# Patient Record
Sex: MALE | URBAN - METROPOLITAN AREA
[De-identification: names, ages, dates, MRNs, and addresses within clinical notes are randomized per-mention and may not be internally consistent; named-entity substitution may affect disease eponyms.]

---

## 2022-11-17 ENCOUNTER — TRANSCRIBE ORDER (OUTPATIENT)
Dept: SCHEDULING | Age: 57
End: 2022-11-17

## 2022-11-17 DIAGNOSIS — M19.071 ARTHRITIS OF RIGHT ANKLE: Primary | ICD-10-CM

## 2022-12-05 ENCOUNTER — HOSPITAL ENCOUNTER (OUTPATIENT)
Dept: LAB | Age: 57
Discharge: HOME | End: 2022-12-05

## 2022-12-05 ENCOUNTER — HOSPITAL ENCOUNTER (OUTPATIENT)
Dept: MRI IMAGING | Age: 57
Discharge: HOME | End: 2022-12-05
Attending: PODIATRIST
Payer: COMMERCIAL

## 2022-12-05 DIAGNOSIS — M19.071 ARTHRITIS OF RIGHT ANKLE: ICD-10-CM

## 2022-12-05 LAB
SENTARA SPECIMEN COL,SENBCF: NORMAL
SENTARA SPECIMEN COL,SENBCF: NORMAL

## 2022-12-05 PROCEDURE — 99001 SPECIMEN HANDLING PT-LAB: CPT

## 2022-12-05 PROCEDURE — 73721 MRI JNT OF LWR EXTRE W/O DYE: CPT

## 2023-02-08 ENCOUNTER — HOSPITAL ENCOUNTER (OUTPATIENT)
Dept: PREADMISSION TESTING | Age: 58
Discharge: HOME | End: 2023-02-08
Payer: MEDICARE

## 2023-02-08 PROCEDURE — 93005 ELECTROCARDIOGRAM TRACING: CPT

## 2023-02-15 ENCOUNTER — ANESTHESIA EVENT (OUTPATIENT)
Facility: HOSPITAL | Age: 58
End: 2023-02-15
Payer: MEDICARE

## 2023-02-16 NOTE — PERIOP NOTE
Per Leah Barajas at Dr. Debby Vila office. He is aware of elevated potassium and will have patient redraw on dos.  They are aware that patient can be canceled if it remains abnormal.

## 2023-02-17 ENCOUNTER — APPOINTMENT (OUTPATIENT)
Facility: HOSPITAL | Age: 58
End: 2023-02-17
Attending: PODIATRIST
Payer: MEDICARE

## 2023-02-17 ENCOUNTER — ANESTHESIA (OUTPATIENT)
Facility: HOSPITAL | Age: 58
End: 2023-02-17
Payer: MEDICARE

## 2023-02-17 ENCOUNTER — HOSPITAL ENCOUNTER (OUTPATIENT)
Facility: HOSPITAL | Age: 58
Setting detail: OUTPATIENT SURGERY
Discharge: HOME | End: 2023-02-17
Attending: PODIATRIST | Admitting: PODIATRIST
Payer: MEDICARE

## 2023-02-17 VITALS
OXYGEN SATURATION: 95 % | WEIGHT: 237.2 LBS | HEART RATE: 63 BPM | TEMPERATURE: 97.4 F | RESPIRATION RATE: 16 BRPM | HEIGHT: 74 IN | SYSTOLIC BLOOD PRESSURE: 139 MMHG | DIASTOLIC BLOOD PRESSURE: 73 MMHG | BODY MASS INDEX: 30.44 KG/M2

## 2023-02-17 DIAGNOSIS — G89.18 POST-OP PAIN: Primary | ICD-10-CM

## 2023-02-17 LAB
GLUCOSE BLD STRIP.AUTO-MCNC: 118 MG/DL (ref 70–110)
GLUCOSE BLD STRIP.AUTO-MCNC: 134 MG/DL (ref 70–110)
POTASSIUM SERPL-SCNC: 4.1 MMOL/L (ref 3.5–5.5)

## 2023-02-17 PROCEDURE — 6360000002 HC RX W HCPCS: Performed by: PODIATRIST

## 2023-02-17 PROCEDURE — C1713 ANCHOR/SCREW BN/BN,TIS/BN: HCPCS | Performed by: PODIATRIST

## 2023-02-17 PROCEDURE — 36415 COLL VENOUS BLD VENIPUNCTURE: CPT

## 2023-02-17 PROCEDURE — 3700000000 HC ANESTHESIA ATTENDED CARE: Performed by: PODIATRIST

## 2023-02-17 PROCEDURE — 84132 ASSAY OF SERUM POTASSIUM: CPT

## 2023-02-17 PROCEDURE — 6360000002 HC RX W HCPCS: Performed by: NURSE ANESTHETIST, CERTIFIED REGISTERED

## 2023-02-17 PROCEDURE — 3600000004 HC SURGERY LEVEL 4 BASE: Performed by: PODIATRIST

## 2023-02-17 PROCEDURE — 7100000000 HC PACU RECOVERY - FIRST 15 MIN: Performed by: PODIATRIST

## 2023-02-17 PROCEDURE — 2720000010 HC SURG SUPPLY STERILE: Performed by: PODIATRIST

## 2023-02-17 PROCEDURE — C1769 GUIDE WIRE: HCPCS | Performed by: PODIATRIST

## 2023-02-17 PROCEDURE — 2580000003 HC RX 258: Performed by: PODIATRIST

## 2023-02-17 PROCEDURE — 6360000002 HC RX W HCPCS: Performed by: ANESTHESIOLOGY

## 2023-02-17 PROCEDURE — 3700000001 HC ADD 15 MINUTES (ANESTHESIA): Performed by: PODIATRIST

## 2023-02-17 PROCEDURE — 7100000001 HC PACU RECOVERY - ADDTL 15 MIN: Performed by: PODIATRIST

## 2023-02-17 PROCEDURE — 7100000011 HC PHASE II RECOVERY - ADDTL 15 MIN: Performed by: PODIATRIST

## 2023-02-17 PROCEDURE — 73620 X-RAY EXAM OF FOOT: CPT

## 2023-02-17 PROCEDURE — 7100000010 HC PHASE II RECOVERY - FIRST 15 MIN: Performed by: PODIATRIST

## 2023-02-17 PROCEDURE — 82962 GLUCOSE BLOOD TEST: CPT

## 2023-02-17 PROCEDURE — 2500000003 HC RX 250 WO HCPCS: Performed by: NURSE ANESTHETIST, CERTIFIED REGISTERED

## 2023-02-17 PROCEDURE — 6370000000 HC RX 637 (ALT 250 FOR IP): Performed by: ANESTHESIOLOGY

## 2023-02-17 PROCEDURE — 3600000014 HC SURGERY LEVEL 4 ADDTL 15MIN: Performed by: PODIATRIST

## 2023-02-17 PROCEDURE — 64445 NJX AA&/STRD SCIATIC NRV IMG: CPT | Performed by: ANESTHESIOLOGY

## 2023-02-17 PROCEDURE — 2709999900 HC NON-CHARGEABLE SUPPLY: Performed by: PODIATRIST

## 2023-02-17 DEVICE — CANNULATED SCREW
Type: IMPLANTABLE DEVICE | Site: FOOT | Status: FUNCTIONAL
Brand: ASNIS

## 2023-02-17 DEVICE — HEADLESS COMPRESSION SCREW
Type: IMPLANTABLE DEVICE | Site: FOOT | Status: FUNCTIONAL
Brand: FIXOS

## 2023-02-17 DEVICE — ALLOGRAFT BNE DBM 10 CC VESUVIUS 4104K0810DC: Type: IMPLANTABLE DEVICE | Site: FOOT | Status: FUNCTIONAL

## 2023-02-17 DEVICE — IMPLANTABLE DEVICE
Type: IMPLANTABLE DEVICE | Site: FOOT | Status: FUNCTIONAL
Brand: EASYFUSE™

## 2023-02-17 DEVICE — GRAFT BNE SUB 3CC RHPDGF BB B TRICALCIUM PHSPTE RADPQ AUG: Type: IMPLANTABLE DEVICE | Site: FOOT | Status: FUNCTIONAL

## 2023-02-17 RX ORDER — PROPOFOL 10 MG/ML
INJECTION, EMULSION INTRAVENOUS PRN
Status: DISCONTINUED | OUTPATIENT
Start: 2023-02-17 | End: 2023-02-17 | Stop reason: SDUPTHER

## 2023-02-17 RX ORDER — ONDANSETRON 2 MG/ML
INJECTION INTRAMUSCULAR; INTRAVENOUS PRN
Status: DISCONTINUED | OUTPATIENT
Start: 2023-02-17 | End: 2023-02-17 | Stop reason: SDUPTHER

## 2023-02-17 RX ORDER — MIDAZOLAM HYDROCHLORIDE 1 MG/ML
INJECTION INTRAMUSCULAR; INTRAVENOUS PRN
Status: DISCONTINUED | OUTPATIENT
Start: 2023-02-17 | End: 2023-02-17 | Stop reason: SDUPTHER

## 2023-02-17 RX ORDER — LORAZEPAM 2 MG/ML
0.5 INJECTION INTRAMUSCULAR ONCE
Status: DISCONTINUED | OUTPATIENT
Start: 2023-02-17 | End: 2023-02-17 | Stop reason: HOSPADM

## 2023-02-17 RX ORDER — FENTANYL CITRATE 50 UG/ML
INJECTION, SOLUTION INTRAMUSCULAR; INTRAVENOUS PRN
Status: DISCONTINUED | OUTPATIENT
Start: 2023-02-17 | End: 2023-02-17 | Stop reason: SDUPTHER

## 2023-02-17 RX ORDER — MIDAZOLAM HYDROCHLORIDE 1 MG/ML
INJECTION INTRAMUSCULAR; INTRAVENOUS
Status: COMPLETED
Start: 2023-02-17 | End: 2023-02-17

## 2023-02-17 RX ORDER — ACETAMINOPHEN 500 MG
1000 TABLET ORAL ONCE
Status: COMPLETED | OUTPATIENT
Start: 2023-02-17 | End: 2023-02-17

## 2023-02-17 RX ORDER — CHLORHEXIDINE GLUCONATE 4 G/100ML
SOLUTION TOPICAL DAILY PRN
Status: DISCONTINUED | OUTPATIENT
Start: 2023-02-17 | End: 2023-02-17 | Stop reason: HOSPADM

## 2023-02-17 RX ORDER — SODIUM CHLORIDE 9 MG/ML
INJECTION, SOLUTION INTRAVENOUS PRN
Status: DISCONTINUED | OUTPATIENT
Start: 2023-02-17 | End: 2023-02-17 | Stop reason: HOSPADM

## 2023-02-17 RX ORDER — SODIUM CHLORIDE 0.9 % (FLUSH) 0.9 %
5-40 SYRINGE (ML) INJECTION PRN
Status: DISCONTINUED | OUTPATIENT
Start: 2023-02-17 | End: 2023-02-17 | Stop reason: HOSPADM

## 2023-02-17 RX ORDER — FENTANYL CITRATE 50 UG/ML
INJECTION, SOLUTION INTRAMUSCULAR; INTRAVENOUS
Status: DISCONTINUED
Start: 2023-02-17 | End: 2023-02-17 | Stop reason: HOSPADM

## 2023-02-17 RX ORDER — SODIUM CHLORIDE 0.9 % (FLUSH) 0.9 %
5-40 SYRINGE (ML) INJECTION EVERY 12 HOURS SCHEDULED
Status: DISCONTINUED | OUTPATIENT
Start: 2023-02-17 | End: 2023-02-17 | Stop reason: HOSPADM

## 2023-02-17 RX ORDER — OXYCODONE HYDROCHLORIDE 5 MG/1
5 TABLET ORAL PRN
Status: DISCONTINUED | OUTPATIENT
Start: 2023-02-17 | End: 2023-02-17 | Stop reason: HOSPADM

## 2023-02-17 RX ORDER — SODIUM CHLORIDE, SODIUM LACTATE, POTASSIUM CHLORIDE, CALCIUM CHLORIDE 600; 310; 30; 20 MG/100ML; MG/100ML; MG/100ML; MG/100ML
INJECTION, SOLUTION INTRAVENOUS CONTINUOUS
Status: DISCONTINUED | OUTPATIENT
Start: 2023-02-17 | End: 2023-02-17 | Stop reason: HOSPADM

## 2023-02-17 RX ORDER — OXYCODONE HYDROCHLORIDE 5 MG/1
10 TABLET ORAL PRN
Status: DISCONTINUED | OUTPATIENT
Start: 2023-02-17 | End: 2023-02-17 | Stop reason: HOSPADM

## 2023-02-17 RX ORDER — HYDROMORPHONE HYDROCHLORIDE 1 MG/ML
0.5 INJECTION, SOLUTION INTRAMUSCULAR; INTRAVENOUS; SUBCUTANEOUS EVERY 5 MIN PRN
Status: DISCONTINUED | OUTPATIENT
Start: 2023-02-17 | End: 2023-02-17 | Stop reason: HOSPADM

## 2023-02-17 RX ORDER — FENTANYL CITRATE 50 UG/ML
25 INJECTION, SOLUTION INTRAMUSCULAR; INTRAVENOUS EVERY 5 MIN PRN
Status: DISCONTINUED | OUTPATIENT
Start: 2023-02-17 | End: 2023-02-17 | Stop reason: HOSPADM

## 2023-02-17 RX ORDER — OXYCODONE HYDROCHLORIDE AND ACETAMINOPHEN 5; 325 MG/1; MG/1
1 TABLET ORAL EVERY 4 HOURS PRN
Status: DISCONTINUED | OUTPATIENT
Start: 2023-02-17 | End: 2023-02-17 | Stop reason: HOSPADM

## 2023-02-17 RX ORDER — LIDOCAINE HYDROCHLORIDE 20 MG/ML
INJECTION, SOLUTION EPIDURAL; INFILTRATION; INTRACAUDAL; PERINEURAL PRN
Status: DISCONTINUED | OUTPATIENT
Start: 2023-02-17 | End: 2023-02-17 | Stop reason: SDUPTHER

## 2023-02-17 RX ORDER — LABETALOL HYDROCHLORIDE 5 MG/ML
10 INJECTION, SOLUTION INTRAVENOUS
Status: DISCONTINUED | OUTPATIENT
Start: 2023-02-17 | End: 2023-02-17 | Stop reason: HOSPADM

## 2023-02-17 RX ORDER — ROPIVACAINE HYDROCHLORIDE 5 MG/ML
INJECTION, SOLUTION EPIDURAL; INFILTRATION; PERINEURAL
Status: COMPLETED | OUTPATIENT
Start: 2023-02-17 | End: 2023-02-17

## 2023-02-17 RX ORDER — ONDANSETRON 2 MG/ML
4 INJECTION INTRAMUSCULAR; INTRAVENOUS
Status: DISCONTINUED | OUTPATIENT
Start: 2023-02-17 | End: 2023-02-17 | Stop reason: HOSPADM

## 2023-02-17 RX ORDER — ONDANSETRON 4 MG/1
4 TABLET, FILM COATED ORAL 3 TIMES DAILY PRN
Qty: 15 TABLET | Refills: 0 | Status: SHIPPED | OUTPATIENT
Start: 2023-02-17

## 2023-02-17 RX ORDER — MEPERIDINE HYDROCHLORIDE 50 MG/ML
12.5 INJECTION INTRAMUSCULAR; INTRAVENOUS; SUBCUTANEOUS ONCE
Status: DISCONTINUED | OUTPATIENT
Start: 2023-02-17 | End: 2023-02-17 | Stop reason: HOSPADM

## 2023-02-17 RX ORDER — HYDRALAZINE HYDROCHLORIDE 20 MG/ML
10 INJECTION INTRAMUSCULAR; INTRAVENOUS
Status: DISCONTINUED | OUTPATIENT
Start: 2023-02-17 | End: 2023-02-17 | Stop reason: HOSPADM

## 2023-02-17 RX ORDER — DIPHENHYDRAMINE HYDROCHLORIDE 50 MG/ML
12.5 INJECTION INTRAMUSCULAR; INTRAVENOUS
Status: DISCONTINUED | OUTPATIENT
Start: 2023-02-17 | End: 2023-02-17 | Stop reason: HOSPADM

## 2023-02-17 RX ORDER — KETOROLAC TROMETHAMINE 10 MG/1
10 TABLET, FILM COATED ORAL EVERY 6 HOURS PRN
Qty: 20 TABLET | Refills: 0 | Status: SHIPPED | OUTPATIENT
Start: 2023-02-17 | End: 2024-02-17

## 2023-02-17 RX ORDER — DROPERIDOL 2.5 MG/ML
0.62 INJECTION, SOLUTION INTRAMUSCULAR; INTRAVENOUS
Status: DISCONTINUED | OUTPATIENT
Start: 2023-02-17 | End: 2023-02-17 | Stop reason: HOSPADM

## 2023-02-17 RX ORDER — OXYCODONE HYDROCHLORIDE AND ACETAMINOPHEN 5; 325 MG/1; MG/1
1 TABLET ORAL EVERY 6 HOURS PRN
Qty: 28 TABLET | Refills: 0 | Status: SHIPPED | OUTPATIENT
Start: 2023-02-17 | End: 2023-02-24

## 2023-02-17 RX ADMIN — FENTANYL CITRATE 100 MCG: 50 INJECTION, SOLUTION INTRAMUSCULAR; INTRAVENOUS at 09:43

## 2023-02-17 RX ADMIN — SODIUM CHLORIDE, SODIUM LACTATE, POTASSIUM CHLORIDE, AND CALCIUM CHLORIDE: 600; 310; 30; 20 INJECTION, SOLUTION INTRAVENOUS at 08:45

## 2023-02-17 RX ADMIN — ONDANSETRON HYDROCHLORIDE 4 MG: 2 INJECTION INTRAMUSCULAR; INTRAVENOUS at 12:35

## 2023-02-17 RX ADMIN — PROPOFOL 200 MG: 10 INJECTION, EMULSION INTRAVENOUS at 10:43

## 2023-02-17 RX ADMIN — MEPIVACAINE HYDROCHLORIDE 10 ML: 20 INJECTION, SOLUTION EPIDURAL; INFILTRATION at 09:43

## 2023-02-17 RX ADMIN — HYDROMORPHONE HYDROCHLORIDE 0.5 MG: 1 INJECTION, SOLUTION INTRAMUSCULAR; INTRAVENOUS; SUBCUTANEOUS at 12:52

## 2023-02-17 RX ADMIN — SODIUM CHLORIDE, SODIUM LACTATE, POTASSIUM CHLORIDE, AND CALCIUM CHLORIDE: 600; 310; 30; 20 INJECTION, SOLUTION INTRAVENOUS at 12:00

## 2023-02-17 RX ADMIN — LIDOCAINE HYDROCHLORIDE 80 MG: 20 INJECTION, SOLUTION EPIDURAL; INFILTRATION; INTRACAUDAL; PERINEURAL at 10:43

## 2023-02-17 RX ADMIN — ACETAMINOPHEN 1000 MG: 500 TABLET ORAL at 08:41

## 2023-02-17 RX ADMIN — HYDROMORPHONE HYDROCHLORIDE 0.5 MG: 1 INJECTION, SOLUTION INTRAMUSCULAR; INTRAVENOUS; SUBCUTANEOUS at 13:02

## 2023-02-17 RX ADMIN — ROPIVACAINE HYDROCHLORIDE 20 ML: 5 INJECTION, SOLUTION EPIDURAL; INFILTRATION; PERINEURAL at 09:43

## 2023-02-17 RX ADMIN — Medication 2 MG: at 10:50

## 2023-02-17 RX ADMIN — MIDAZOLAM 2 MG: 1 INJECTION INTRAMUSCULAR; INTRAVENOUS at 09:43

## 2023-02-17 ASSESSMENT — PAIN SCALES - GENERAL
PAINLEVEL_OUTOF10: 3
PAINLEVEL_OUTOF10: 0

## 2023-02-17 ASSESSMENT — PAIN DESCRIPTION - DESCRIPTORS
DESCRIPTORS: ACHING
DESCRIPTORS: ACHING

## 2023-02-17 ASSESSMENT — PAIN DESCRIPTION - ORIENTATION: ORIENTATION: RIGHT

## 2023-02-17 ASSESSMENT — PAIN - FUNCTIONAL ASSESSMENT: PAIN_FUNCTIONAL_ASSESSMENT: 0-10

## 2023-02-17 ASSESSMENT — PAIN DESCRIPTION - LOCATION: LOCATION: FOOT

## 2023-02-17 NOTE — PERIOP NOTE
TRANSFER - OUT REPORT:    Verbal report given to Constantino Hankins on Mary Maldonado  being transferred to Phase 2  for routine progression of patient care       Report consisted of patient's Situation, Background, Assessment and   Recommendations(SBAR). Information from the following report(s) Nurse Handoff Report was reviewed with the receiving nurse. [place] Assessment: No data recorded  Lines:   Peripheral IV 02/17/23 Right Hand (Active)   Site Assessment Clean, dry & intact 02/17/23 1525   Line Status Infusing 02/17/23 1525   Phlebitis Assessment No symptoms 02/17/23 1525   Infiltration Assessment 0 02/17/23 1525   Dressing Status Clean;Dry; Intact 02/17/23 1525   Dressing Type Transparent 02/17/23 1525        Opportunity for questions and clarification was provided.       Patient transported with:  Registered Nurse

## 2023-02-17 NOTE — ANESTHESIA PROCEDURE NOTES
Peripheral Block    Patient location during procedure: pre-op  Reason for block: procedure for pain and at surgeon's request  Start time: 2/17/2023 9:43 AM  End time: 2/17/2023 9:52 AM  Staffing  Performed: anesthesiologist   Anesthesiologist: Juliet Martell MD  Preanesthetic Checklist  Completed: patient identified, IV checked, site marked, risks and benefits discussed, surgical/procedural consents, equipment checked, pre-op evaluation, timeout performed, anesthesia consent given, oxygen available and monitors applied/VS acknowledged  Peripheral Block   Patient position: left lateral decubitus  Prep: ChloraPrep  Provider prep: mask  Patient monitoring: cardiac monitor, continuous pulse ox, IV access, oxygen, responsive to questions and frequent blood pressure checks  Block type: Sciatic  Popliteal  Laterality: right  Injection technique: single-shot  Guidance: nerve stimulator and ultrasound guided    Needle   Needle type: insulated echogenic nerve stimulator needle   Needle gauge: 22 G  Needle localization: nerve stimulator and ultrasound guidance  Needle length: 5 cm  Assessment   Injection assessment: negative aspiration for heme, no paresthesia on injection, local visualized surrounding nerve on ultrasound and no intravascular symptoms  Slow fractionated injection: yes  Hemodynamics: stable  Outcomes: uncomplicated and patient tolerated procedure well    Additional Notes  Ultrasound was used to visualize nerves and local anesthetic spread.   Medications Administered  mepivacaine 2 % - Perineural   10 mL - 2/17/2023 9:43:00 AM  ropivacaine (NAROPIN) injection 0.5% - Perineural   20 mL - 2/17/2023 9:43:00 AM

## 2023-02-17 NOTE — BRIEF OP NOTE
Brief Postoperative Note      Patient: Leida Holly  YOB: 1965  MRN: [id number]    Date of Procedure: 2/17/2023    Pre-Op Diagnosis: RIGHT FOOT ARTHRITIS, HALLUX RIGIDUS, PEL PLANUS    Post-Op Diagnosis: Same       Procedure(s):  RIGHT FOOT SUBTALAR JOINT, TALAR NAVICULAR ARTHRODESIS WITH MINI C-ARM GEN WITH POPLITEAL BLOCK \"SPEC POP\"    Surgeon(s):  Cathy Phelps DPM    Assistant:  Surgical Assistant: Park Baker    Anesthesia: General, Popliteal block performed by anesthesia    Estimated Blood Loss (mL): less than 939     Complications: None    Specimens:   * No specimens in log *    Implants:  Implant Name Type Inv.  Item Serial No.  Lot No. LRB No. Used Action   GRAFT BNE INJ 3 CC AUG - [id number]  GRAFT BNE INJ 3 CC AUG  [employer] [facility] [id number] Right 1 Implanted   GRAFT BNE SUB 3CC RHPDGF BB B TRICALCIUM PHSPTE RADPQ AUG - [id number]  GRAFT BNE SUB 3CC RHPDGF BB B TRICALCIUM PHSPTE RADPQ AUG  [facility] [employer]- [id number] Right 1 Implanted   EASYFUSE STAPLE 25X20 NITINOL 4-LEG - [id number]  EASYFUSE STAPLE 25X20 NITINOL [facility] [employer]- [id number] Right 1 Implanted   ALLOGRAFT BNE DBM 10 CC VESUVIUS [id number] - [id number]  ALLOGRAFT BNE DBM 10 CC VESUVIUS [id number] [employer] [facility]  Right 1 Implanted   EASYFUSE STAPLE 25X20 NITINOL 2-LEG - [id number]  EASYFUSE STAPLE 25X20 NITINOL 2-LEG  [employer] [id number] Right 1 Implanted   SCREW HEADLESS COMP [id number] SHRT THRD - [id number]  SCREW HEADLESS COMP [id number] SHRT THRD  [employer] [facility] 000 Right 1 Implanted   SCREW ASHER TI 6.5P837SI - [id number]  SCREW ASHER TI 6.6L375OI  [employer] [facility] 000 Right 1 Implanted         Drains: * No LDAs found *    Findings: consistent with pre-op diagnosis    Electronically signed by Cathy Phelps DPM on 2/17/2023 at 3:26 PM

## 2023-02-17 NOTE — DISCHARGE INSTRUCTIONS
Post Operative Instructions                                                                 Martin Moritz, DPM        General:  - ELEVATE, ELEVATE, ELEVATE! Elevate the operative leg as much as possible during the first 3-5 days. This will help with healing, pain, and comfort. - Elevate you leg above the level of your waist. Prop it up on multiple pillows to ensure the leg is cushioned appropriately. - Apply ice behind the knee 3-4 times a day, 20 minutes on/ 20 minutes off. Do this for the first 2-3 days to help with pain and swelling.  - Use crutches, knee scooter, wheelchair, walker, etc. To ensure that NO WEIGHT IS PUT ON THE OPERATIVE LEG.  - Get plenty of rest.  - On the ride home from the hospital ensure your leg is propped up in the backseat of the car and elevated. Bandages:   - Keep you bandages clean, dry, and intact. - Do not remove or change your dressing unless instructed by the surgeons office  - Ensure that the bandages do not get wet while bathing. Use a cast cover or water proof covering to make sure. If the dressings do get wet, you must let the office know and we will change the dressing. Not doing so can cause wound and/or infection. Medications:  -Take medications as instructed. Typically as follows:  - Percocet: every 4-6 hours as needed for severe pain. Take one of these before bed the night of surgery, even if not having any pain at that time. This will prevent pain in the middle of the night if the anesthesia wears off at that time. Take with food to help prevent nausea. - Ketorolac: take every 6 hours and a scheduled basis. This is not a medication to take as needed, you should be taking this on a regular schedule. Take one every 6 hours until gone. This will help with inflammation, pain, and help prevent any blood clot. - Zofran: Take as needed for nausea.   - Do not combine percocet and tylenol, do not combine ketorolac with other NSAIDs such as aspirin or ibuprofen. - Once percocet is no longer needed, you may begin taking 1000mg Tylenol three times a day for pain, which you may combine with 800mg of Ibuprofen three times a day. - Call with any questions or concerns    DISCHARGE SUMMARY from Nurse    PATIENT INSTRUCTIONS:    After general anesthesia or intravenous sedation, for 24 hours or while taking prescription Narcotics:  Limit your activities  Do not drive and operate hazardous machinery  Do not make important personal or business decisions  Do  not drink alcoholic beverages  If you have not urinated within 8 hours after discharge, please contact your surgeon on call. Report the following to your surgeon:  Excessive pain, swelling, redness or odor of or around the surgical area  Temperature over 100.5  Nausea and vomiting lasting longer than 4 hours or if unable to take medications  Any signs of decreased circulation or nerve impairment to extremity: change in color, persistent  numbness, tingling, coldness or increase pain  Any questions    What to do at Home:  Recommended activity: ,   REGULAR DIET  RETURN TO OFFICE AS SCHEDULED      If you experience any of the following symptoms heavy bleeding, fevers, severe pain, circulation changes, please follow up with dr Jessica Rm. *  Please give a list of your current medications to your Primary Care Provider. *  Please update this list whenever your medications are discontinued, doses are      changed, or new medications (including over-the-counter products) are added. *  Please carry medication information at all times in case of emergency situations. These are general instructions for a healthy lifestyle:    No smoking/ No tobacco products/ Avoid exposure to second hand smoke  Surgeon General's Warning:  Quitting smoking now greatly reduces serious risk to your health.     Obesity, smoking, and sedentary lifestyle greatly increases your risk for illness    A healthy diet, regular physical exercise & weight monitoring are important for maintaining a healthy lifestyle    You may be retaining fluid if you have a history of heart failure or if you experience any of the following symptoms:  Weight gain of 3 pounds or more overnight or 5 pounds in a week, increased swelling in our hands or feet or shortness of breath while lying flat in bed. Please call your doctor as soon as you notice any of these symptoms; do not wait until your next office visit. The discharge information has been reviewed with the patient and caregiver. The patient and caregiver verbalized understanding. Discharge medications reviewed with the patient and caregiver and appropriate educational materials and side effects teaching were provided.   ___________________________________________________________________________________________________________________________________  Patient armband removed and shredded

## 2023-02-17 NOTE — ANESTHESIA PRE PROCEDURE
Department of Anesthesiology  Preprocedure Note       Name:  Georges Bean   Age:  61 y.o.  :  1965                                          MRN:  [id number]         Date:  2023      Surgeon: Xavi Esquivel):  Esha Johnson DPM    Procedure: Procedure(s):  RIGHT FOOT SUBTALAR JOINT, TALAR NAVICULAR AND FIRST METATARSAL PHALANGEAL JOINT ARTHRODESIS OF ALL RIGHT FOOT WITH MINI C-ARM GEN WITH POPLITEAL BLOCK \"SPEC POP\"    Medications prior to admission:   Prior to Admission medications    Medication Sig Start Date End Date Taking? Authorizing Provider   aspirin 81 MG EC tablet Take 81 mg by mouth nightly    Historical Provider, MD   escitalopram (LEXAPRO) 10 MG tablet Take 10 mg by mouth daily Indications: axiety and dression    Historical Provider, MD   gabapentin (NEURONTIN) 300 MG capsule Take 600 mg by mouth 4 times daily. Indications: neuropathy    Historical Provider, MD   ferrous sulfate (IRON 325) 325 (65 Fe) MG tablet Take 325 mg by mouth daily (with breakfast) Indications: anemia    Historical Provider, MD   meloxicam (MOBIC) 15 MG tablet Take 15 mg by mouth daily    Historical Provider, MD   vitamin E 1000 units capsule Take 1,000 Units by mouth daily    Historical Provider, MD   potassium chloride (KLOR-CON) 8 MEQ extended release tablet Take 8 mEq by mouth daily    Historical Provider, MD   Multiple Vitamins-Minerals (MULTIVITAL PO) Take 1 tablet by mouth daily    Historical Provider, MD   furosemide (LASIX) 20 MG tablet Take 20 mg by mouth daily Indications: edema    Historical Provider, MD   Cyanocobalamin (VITAMIN B-12) 6000 MCG SUBL Place 1 capsule under the tongue daily  Patient not taking: Reported on 2023    Historical Provider, MD   aspirin 325 MG tablet Take 325 mg by mouth daily    Historical Provider, MD   oxyCODONE (OXYCONTIN) 10 MG extended release tablet Take 10 mg by mouth 4 times daily as needed for Pain.     Historical Provider, MD   traMADol [facility]) 200 MG extended release tablet Take 200 mg by mouth nightly. Indications: pain    Historical Provider, MD       Current medications:    Current Facility-Administered Medications   Medication Dose Route Frequency Provider Last Rate Last Admin    chlorhexidine (HIBICLENS) 4 % liquid   Topical Daily PRN Graciela Maguire DPM        ceFAZolin (ANCEF) 2000 mg in sterile water 20 mL IV syringe  2,000 mg IntraVENous On Call to Feroz Dietrich DPM        lactated ringers IV soln infusion   IntraVENous Continuous Graciela Maguire  mL/hr at 02/17/23 0845 New Bag at 02/17/23 0845       Allergies:  No Known Allergies    Problem List:  There is no problem list on file for this patient.       Past Medical History:        Diagnosis Date    Afib [facility] [address] 2018    H/O    Anxiety and depression 2008    Aphasia 2008    from cva    Cerebral artery occlusion with cerebral infarction [facility]) 2008    left side-weakness right side    Diabetes mellitus ([facility] [address] 2006    diet control    Edema 2018    lower legs    Memory changes 2008    Peripheral neuropathy 2006    PFO (patent foramen ovale) 03/2006       Past Surgical History:        Procedure Laterality Date    BACK SURGERY  2004    L1-L2, S5 nuclear fusion    COLONOSCOPY  2019    every 10 yrs    JOINT REPLACEMENT Left 2021    knee - total    KNEE ARTHROSCOPY Bilateral 1983    left x2 1984, right [address]  2010    x2    ORTHOPEDIC SURGERY Bilateral 1980    ankles surgery repair from [address] Right 2018    foot drop surgery    OTHER SURGICAL HISTORY  2018    Shield surgery with stem cell    PATENT FORAMEN OVALE CLOSURE  03/2006    TRACHEOSTOMY  1966    WISDOM TOOTH EXTRACTION      x4       Social History:    Social History     Tobacco Use    Smoking status: Never     Passive exposure: Never    Smokeless tobacco: Never   Substance Use Topics    Alcohol use: Never Counseling given: Not Answered      Vital Signs (Current):   Vitals:    02/17/23 0755   BP: 129/82   Pulse: 58   Resp: 18   Temp: 97.5 °F (36.4 °C)   TempSrc: Tympanic   SpO2: 98%   Weight: 237 lb 3.2 oz (107.6 kg)   Height: 6' 2\" (1.88 m)                                              BP Readings from Last 3 Encounters:   02/17/23 129/82       NPO Status: Time of last liquid consumption: 1830                        Time of last solid consumption: 1830                        Date of last liquid consumption: 02/16/23                        Date of last solid food consumption: 02/16/23    BMI:   Wt Readings from Last 3 Encounters:   02/17/23 237 lb 3.2 oz (107.6 kg)   02/07/23 205 lb (93 kg)     Body mass index is 30.45 kg/m². CBC: No results found for: WBC, RBC, HGB, HCT, MCV, RDW, PLT    CMP:   Lab Results   Component Value Date/Time    K 4.1 02/17/2023 08:18 AM       POC Tests:   Recent Labs     02/17/23  0838   POCGLU 134*       Coags: No results found for: PROTIME, INR, APTT    HCG (If Applicable): No results found for: PREGTESTUR, PREGSERUM, HCG, HCGQUANT     ABGs: No results found for: PHART, PO2ART, BZW3MWK, TXF6MVH, BEART, X1NEOLVG     Type & Screen (If Applicable):  No results found for: LABABO, LABRH    Drug/Infectious Status (If Applicable):  No results found for: HIV, HEPCAB    COVID-19 Screening (If Applicable): No results found for: COVID19        Anesthesia Evaluation  Patient summary reviewed and Nursing notes reviewed  Airway: Mallampati: II  TM distance: >3 FB   Neck ROM: full  Mouth opening: > = 3 FB   Dental: normal exam         Pulmonary:                              Cardiovascular:Negative CV ROS  Exercise tolerance: good (>4 METS),                     Neuro/Psych:   (+) CVA: residual symptoms, neuromuscular disease:,             GI/Hepatic/Renal: Neg GI/Hepatic/Renal ROS            Endo/Other:    (+) Diabetes, . Abdominal:             Vascular: negative vascular ROS. Other Findings:           Anesthesia Plan      regional and general     ASA 2       Induction: intravenous. Anesthetic plan and risks discussed with patient.                         Yenni Nixon MD   2/17/2023

## 2023-02-17 NOTE — PROGRESS NOTES
Reviewed PTA medication list with patient/caregiver and patient/caregiver denies any additional medications. Patient admits to having a responsible adult care for them at home for at least 24 hours after surgery. Patient encouraged to use gown warming system and informed that using said warming gown to regulate body temperature prior to a procedure has been shown to help reduce the risks of blood clots and infection. Patient's pharmacy of choice verified and documented in PTA medication section. Dual skin assessment & fall risk band verification completed with EDGAR Musa RN.

## 2023-02-17 NOTE — PERIOP NOTE
TRANSFER - IN REPORT:    Verbal report received from Marjorie rn on Frederic Will  being received from [facility]u for routine post-op      Report consisted of patient's Situation, Background, Assessment and   Recommendations(SBAR). Information from the following report(s) Nurse Handoff Report was reviewed with the receiving nurse. Opportunity for questions and clarification was provided. Assessment completed upon patient's arrival to unit and care assumed.

## 2023-02-17 NOTE — ANESTHESIA POSTPROCEDURE EVALUATION
Post-Anesthesia Evaluation and Assessment    Cardiovascular Function/Vital Signs  Visit Vitals  /79   Pulse 63   Temp 97.5 °F (36.4 °C) (Tympanic)   Resp 13   Ht 6' 2\" (1.88 m)   Wt 237 lb 3.2 oz (107.6 kg)   SpO2 90%   BMI 30.45 kg/m²       Patient is status post Procedure(s):  RIGHT FOOT SUBTALAR JOINT, TALAR NAVICULAR ARTHRODESIS WITH MINI C-ARM GEN WITH POPLITEAL BLOCK \"SPEC POP\". Nausea/Vomiting: Controlled. Postoperative hydration reviewed and adequate. Pain:      Managed. Neurological Status: At baseline. Mental Status and Level of Consciousness: Arousable. Pulmonary Status:       Adequate oxygenation and airway patent. Complications related to anesthesia: None    Post-anesthesia assessment completed. No concerns. Patient has met all discharge requirements.     Signed By: Gaby Geiger MD    February 17, 2023

## 2023-02-17 NOTE — H&P
HISTORY AND PHYSICAL             Date: 2/17/2023        Patient Name: Frederic Will     YOB: 1965      Age:  61 y.o. Chief Complaint   No chief complaint on file. Right foot pain, painful flatfoot deformity    History Obtained From   patient    History of Present Illness   Patient presents with chronic painful flatfoot deformity with history of prior kidner-like procedure. Patient has failed conservative treatment modalities to date. Patient wound like to pursue surgical correction for the painful deformities. Past Medical History     Past Medical History:   Diagnosis Date    Afib ([facility] [address] 2018    H/O    Anxiety and depression 2008    Aphasia 2008    from cva    Cerebral artery occlusion with cerebral infarction [facility]) 2008    left side-weakness right side    Diabetes mellitus ([facility] [address] 2006    diet control    Edema 2018    lower legs    Memory changes 2008    Peripheral neuropathy 2006    PFO (patent foramen ovale) 03/2006        Past Surgical History     Past Surgical History:   Procedure Laterality Date    BACK SURGERY  2004    L1-L2, S5 nuclear fusion    COLONOSCOPY  2019    every 10 yrs    JOINT REPLACEMENT Left 2021    knee - total    KNEE ARTHROSCOPY Bilateral 1983    left x2 1984, right [address]  2010    x2    ORTHOPEDIC SURGERY Bilateral 1980    ankles surgery repair from [address] Right 1980    thumb    ORTHOPEDIC SURGERY Right 2018    foot drop surgery    OTHER SURGICAL HISTORY  2018    Shield surgery with stem cell    PATENT FORAMEN OVALE CLOSURE  03/2006    TRACHEOSTOMY  1966    WISDOM TOOTH EXTRACTION      x4        Medications Prior to Admission     Prior to Admission medications    Medication Sig Start Date End Date Taking?  Authorizing Provider   aspirin 81 MG EC tablet Take 81 mg by mouth nightly    Historical Provider, MD   escitalopram (LEXAPRO) 10 MG tablet Take 10 mg by mouth daily Indications: axiety and dression    Historical Provider, MD gabapentin (NEURONTIN) 300 MG capsule Take 600 mg by mouth 4 times daily. Indications: neuropathy    Historical Provider, MD   ferrous sulfate (IRON 325) 325 (65 Fe) MG tablet Take 325 mg by mouth daily (with breakfast) Indications: anemia    Historical Provider, MD   meloxicam (MOBIC) 15 MG tablet Take 15 mg by mouth daily    Historical Provider, MD   vitamin E 1000 units capsule Take 1,000 Units by mouth daily    Historical Provider, MD   potassium chloride (KLOR-CON) 8 MEQ extended release tablet Take 8 mEq by mouth daily    Historical Provider, MD   Multiple Vitamins-Minerals (MULTIVITAL PO) Take 1 tablet by mouth daily    Historical Provider, MD   furosemide (LASIX) 20 MG tablet Take 20 mg by mouth daily Indications: edema    Historical Provider, MD   Cyanocobalamin (VITAMIN B-12) 6000 MCG SUBL Place 1 capsule under the tongue daily  Patient not taking: Reported on 2/17/2023    Historical Provider, MD   aspirin 325 MG tablet Take 325 mg by mouth daily    Historical Provider, MD   oxyCODONE (OXYCONTIN) 10 MG extended release tablet Take 10 mg by mouth 4 times daily as needed for Pain. Historical Provider, MD   traMADol [facility]) 200 MG extended release tablet Take 200 mg by mouth nightly. Indications: pain    Historical Provider, MD        Allergies   Patient has no known allergies. Social History     Social History       Tobacco History       Smoking Status  Never      Passive Exposure  Never      Smokeless Tobacco Use  Never              Alcohol History       Alcohol Use Status  Never              Drug Use       Drug Use Status  Never              Sexual Activity       Sexually Active  Never                    Family History   History reviewed. No pertinent family history.     Review of Systems   Review of Systems  WNL  Physical Exam   /82   Pulse 58   Temp 97.5 °F (36.4 °C) (Tympanic)   Resp 18   Ht 6' 2\" (1.88 m)   Wt 237 lb 3.2 oz (107.6 kg)   SpO2 98%   BMI 30.45 kg/m²     Physical Exam  Constitutional:       Appearance: Normal appearance. Musculoskeletal:         General: Tenderness and deformity present. Skin:     General: Skin is warm and dry. Neurological:      Mental Status: He is alert. Right flatfoot deformity, rigid. Right hallux rigidus    Labs      Recent Results (from the past 24 hour(s))   Potassium    Collection Time: 02/17/23  8:18 AM   Result Value Ref Range    Potassium 4.1 3.5 - 5.5 mmol/L   POCT Glucose    Collection Time: 02/17/23  8:38 AM   Result Value Ref Range    POC Glucose 134 (H) 70 - 110 mg/dL        Imaging/Diagnostics Last 24 Hours   No results found.     Assessment      Right foot painful arthritic flatfoot deformity, right foot hallux rigidus  Plan   Patient has elected for surgical intervention to correct the painful deformites after understanding all perioperative protocols, risks, and complications, will follow up in office for aftercare    Consultations Ordered:  None    Electronically signed by Jose Alfredo Aponte DPM on 2/17/23 at 9:46 AM EST

## 2023-02-18 NOTE — OP NOTE
Operative Note      Patient: Georges Bean  YOB: 1965  MRN: [id number]    Date of Procedure: 2/17/2023    Pre-Op Diagnosis: RIGHT FOOT ARTHRITIS, PES PLANUS    Post-Op Diagnosis: Same       Procedure(s):  RIGHT FOOT SUBTALAR JOINT, TALAR NAVICULAR ARTHRODESIS WITH MINI C-ARM GEN WITH POPLITEAL BLOCK \"SPEC POP\"    Surgeon(s):  Esha Johnson DPM    Assistant:   Surgical Assistant: Harsh Johnson    Anesthesia: General    Estimated Blood Loss (mL): less than 635     Complications: None    Specimens:   * No specimens in log *    Implants:  Implant Name Type Inv. Item Serial No.  Lot No. LRB No. Used Action   GRAFT BNE INJ 3 CC AUG - [id number]  GRAFT BNE INJ 3 CC AUG  [employer] [facility] [id number] Right 1 Implanted   GRAFT BNE SUB 3CC RHPDGF BB B TRICALCIUM PHSPTE RADPQ AUG - [id number]  GRAFT BNE SUB 3CC RHPDGF BB B TRICALCIUM PHSPTE RADPQ AUG  [facility] [employer] [facility] [id number] Right 1 Implanted   EASYFUSE STAPLE 25X20 NITINOL 4-LEG - [id number]  EASYFUSE STAPLE 25X20 NITINOL [facility] [employer] [id number] Right 1 Implanted   ALLOGRAFT BNE DBM 10 CC VESUVIUS [id number] - [id number]  ALLOGRAFT BNE DBM 10 CC VESUVIUS [id number] [employer] [facility]  Right 1 Implanted   EASYFUSE STAPLE 25X20 NITINOL 2-LEG - [id number]  EASYFUSE STAPLE 25X20 NITINOL 2-LEG  [email or web address] [id number] Right 1 Implanted   SCREW HEADLESS COMP [id number] SHRT THRD - [id number]  SCREW BNE [id number] COMPR HDLSS SHT THRD  [employer] [facility] 000 Right 1 Implanted   SCREW ASHER TI [id number] - [id number]  SCREW BNE L100MM DIA6.5MM CANC NOHEMY TI SELF DRL ST ASHER  [employer] [facility] 000 Right 1 Implanted         Drains: * No LDAs found *    Findings: consistent with pre-op diagnosis    Detailed Description of Procedure: The patient was brought into the operating room and secured on the operative table in the supine position.  Following a timeout and the administration of anesthesia the lower extremity was then scrubbed, prepped, and draped in sterile fashion. Following exsanguination of the extremity, the pneumatic tourniquet was inflated to 350mmHg. A well planned and marked lateral incision was then made from the tip of the distal fibula down towards the 4th metatarsal base. Sharp dissection was carried down through the   subcutaneous tissue. All bleeding controlled with electrocautery. Dissection   was then carried down the extensor hallucis and digitorum brevis muscle. Fascia is maintained. We were able to elevate the sinus tarsi fat pad. The   sinus tarsi and the contents of the sinus tarsi were removed sharply. The posterior and middle facets of the subtalar joint were then identified and lamina  was inserted for visualization. The joints were then prepped with combination of osteotomes and curettage. Joints were flushed and irrigated with copious amounts of normal saline solution. The subtalar joint was noted to be largely free and able to be reduced into a more proper position and alignment. Opposing joint surfaces were then fenestrated with a 2.7 mm drill bit. A wet sponge was placed into the wound. Attention was then directed to the talonavicular joint where a well planned and marked incision was made medial to the tibialis anterior tendon from the ankle level down to the navicular tuberosity. Dissection was carried through the subcutaneous tissue with care to retract and protect all neurovascular and tendinous structures. All bleeding was controlled with electrocautery. Incision was carried down to the level of the capsule. Capsule was opened. The talonavicular joint was then distracted with use of Hinterman distractor and articular surfaces were removed with combination of osteotomes and curettage. Site was flushed and irrigated with copious amounts of normal saline solution.  At this time the TN joint was largely mobile and could be manually reduce into an improved position. Next opposing joint surface were fenestrated with 2.0 mm drill bit. Once that had been completed we reduced the subtalar joint first and placed two pins checking both the lateral, axial, and sagittal corrections. Once that had been felt to be in the desired position, we then manually reduced the talonavicular joint into improved position and alignment, and temporarily fixated this with K-wire. This was also confirmed with multiple views of fluoroscopy. Pneumatic tourniquet was deflated and prompt hyperemic response was noted to the distal aspect of the extremity. Compression was applied to the surgical sites for hemostasis. The subtalar joint K-wires were then used to drill for the aforementioned 7.0 mm and 6.5 mm screws. These were both then inserted according to company protocol with intraoperative fluoroscopy. There was noted compression across the subtalar joint. Any remaining deficit was then filled with combination of bone augment and cancellous chips. After waiting the appropriate amount of time for reperfusion, the tourniquet was again inflated. Next aforementioned staples were used to fixate the talonavicular joint in accordance with company protocol with aide of intraoperative fluoroscopy. Any remaining deficit was filled with cancellous chips. Final imaging was taken which confirmed much improved position of the subtalar joints and talonavicular joint. Surgical sites were then flushed and irrigated with copious amounts of normal saline solution. A layered anatomic closure technique was performed utilizing a combination of monocryl and prolene sutures. Limb was cleansed and dressed with xeroform, 4x4 gauze, kerlix, soft roll posterior splint, and ACE. The pneumatic tourniquet was deflated and prompt hyperemic response was noted to the distal aspect of the extremity.  All counts were correct and patient was transported to recovery with vital signs stable and vascular status intact.       Electronically signed by Zena Solorzano DPM on 2/17/2023 at 8:53 PM

## 2023-04-07 ENCOUNTER — TRANSCRIBE ORDERS (OUTPATIENT)
Facility: HOSPITAL | Age: 58
End: 2023-04-07

## 2023-04-07 ENCOUNTER — HOSPITAL ENCOUNTER (OUTPATIENT)
Facility: HOSPITAL | Age: 58
Discharge: HOME | End: 2023-04-07
Payer: COMMERCIAL

## 2023-04-07 DIAGNOSIS — M21.41 PES PLANUS OF RIGHT FOOT: ICD-10-CM

## 2023-04-07 DIAGNOSIS — M96.0 NONUNION AFTER ARTHRODESIS: ICD-10-CM

## 2023-04-07 DIAGNOSIS — Z98.890 STATUS POST RIGHT FOOT SURGERY: Primary | ICD-10-CM

## 2023-04-07 DIAGNOSIS — Z98.890 STATUS POST RIGHT FOOT SURGERY: ICD-10-CM

## 2023-04-07 DIAGNOSIS — T84.84XA PAINFUL ORTHOPAEDIC HARDWARE (HCC): ICD-10-CM

## 2023-04-07 PROCEDURE — 73700 CT LOWER EXTREMITY W/O DYE: CPT

## 2023-06-16 ENCOUNTER — HOSPITAL ENCOUNTER (OUTPATIENT)
Facility: HOSPITAL | Age: 58
Discharge: HOME | End: 2023-06-19

## 2023-06-16 ENCOUNTER — HOSPITAL ENCOUNTER (OUTPATIENT)
Facility: HOSPITAL | Age: 58
Discharge: HOME | End: 2023-06-19
Payer: MEDICARE

## 2023-06-16 DIAGNOSIS — M79.671 RIGHT FOOT PAIN: ICD-10-CM

## 2023-06-16 LAB — SENTARA SPECIMEN COLLECTION: NORMAL

## 2023-06-16 PROCEDURE — 93005 ELECTROCARDIOGRAM TRACING: CPT

## 2023-06-19 LAB
EKG ATRIAL RATE: 77 BPM
EKG DIAGNOSIS: NORMAL
EKG P AXIS: 51 DEGREES
EKG P-R INTERVAL: 194 MS
EKG Q-T INTERVAL: 398 MS
EKG QRS DURATION: 100 MS
EKG QTC CALCULATION (BAZETT): 450 MS
EKG R AXIS: -31 DEGREES
EKG T AXIS: 54 DEGREES
EKG VENTRICULAR RATE: 77 BPM

## (undated) DEVICE — SINGLE PORT MANIFOLD: Brand: NEPTUNE 2

## (undated) DEVICE — SUTURE ABSORBABLE MONOFILAMENT 4-0 PS2 27 IN UD MONOCRYL + SXMP1B119

## (undated) DEVICE — GARMENT,MEDLINE,DVT,INT,CALF,MED, GEN2: Brand: MEDLINE

## (undated) DEVICE — APPLICATOR MEDICATED 26 CC SOLUTION HI LT ORNG CHLORAPREP

## (undated) DEVICE — SUTURE PROL SZ 4-0 L18IN NONABSORBABLE BLU L19MM PS-2 3/8 8682G

## (undated) DEVICE — 3M™ BAIR PAWS FLEX™ WARMING GOWN, SMALL, 20 PER CASE 81103: Brand: BAIR PAWS™

## (undated) DEVICE — CANNULATED COUNTERSINK

## (undated) DEVICE — GLOVE ORANGE PI 7 1/2   MSG9075

## (undated) DEVICE — CANNULATED DRILL

## (undated) DEVICE — SYRINGE MED 30ML STD CLR PLAS LUERLOCK TIP N CTRL DISP

## (undated) DEVICE — SUTURE NONABSORBABLE MONOFILAMENT 4-0 PS-2 18 IN BLU PROLENE 8682H

## (undated) DEVICE — PACK PROCEDURE SURG EXTREMITY CUST

## (undated) DEVICE — DYNAMIC COMPRESSION SYSTEM: Brand: EASYFUSE

## (undated) DEVICE — GAUZE,SPONGE,8"X4",12PLY,XRAY,STRL,LF: Brand: MEDLINE

## (undated) DEVICE — TIDISHIELD C-ARM EQUIPMENT COVERS CLEAR POLYETHYLENE STERILE 42IN X 140IN 10 PER CASE: Brand: TIDISHIELD

## (undated) DEVICE — DRESSING,GAUZE,XEROFORM,CURAD,1"X8",ST: Brand: CURAD

## (undated) DEVICE — BANDAGE,GAUZE,BULKEE II,4.5"X4.1YD,STRL: Brand: MEDLINE

## (undated) DEVICE — INTENDED FOR TISSUE SEPARATION, AND OTHER PROCEDURES THAT REQUIRE A SHARP SURGICAL BLADE TO PUNCTURE OR CUT.: Brand: BARD-PARKER ® CARBON RIB-BACK BLADES

## (undated) DEVICE — STRAP,POSITIONING,KNEE/BODY,FOAM,4X60": Brand: MEDLINE

## (undated) DEVICE — BANDAGE COMPR W6INXL3YD EXSANGUATION 1 PLY ESMARCH

## (undated) DEVICE — ZIMMER® STERILE DISPOSABLE TOURNIQUET CUFF WITH PROTECTIVE SLEEVE AND PLC, SINGLE PORT, SINGLE BLADDER, 34 IN. (86 CM)

## (undated) DEVICE — UNTHREADED GUIDE WIRE: Brand: FIXOS

## (undated) DEVICE — SYRINGE MED 10ML TRNSLUC BRL PLUNG BLK MRK POLYPR CTRL

## (undated) DEVICE — SOL IRRIGATION INJ NACL 0.9% 500ML BTL

## (undated) DEVICE — SPONGE LAPAROTOMY W18XL18IN WHITE STRUNG RADIOPAQUE STERILE

## (undated) DEVICE — POINTED DRILL BIT

## (undated) DEVICE — ELECTRODE PT RET AD L9FT HI MOIST COND ADH HYDRGEL CORDED

## (undated) DEVICE — KIT ARMOR C DRP COLLAPSIBLE AND SELF EXP TOP CVR FOR FLUOROSCOPIC

## (undated) DEVICE — SUTURE MCRYL SZ 3-0 L27IN ABSRB UD PS-2 3/8 CIR REV CUT NDL MCP427H

## (undated) DEVICE — BANDAGE,ELASTIC,ESMARK,STERILE,6"X9',LF: Brand: MEDLINE

## (undated) DEVICE — GUIDE WIRE